# Patient Record
Sex: FEMALE | Race: WHITE | URBAN - NONMETROPOLITAN AREA
[De-identification: names, ages, dates, MRNs, and addresses within clinical notes are randomized per-mention and may not be internally consistent; named-entity substitution may affect disease eponyms.]

---

## 2017-01-01 ENCOUNTER — TELEPHONE (OUTPATIENT)
Dept: FAMILY MEDICINE | Facility: OTHER | Age: 82
End: 2017-01-01

## 2017-01-01 ENCOUNTER — NURSING HOME VISIT (OUTPATIENT)
Dept: FAMILY MEDICINE | Facility: OTHER | Age: 82
End: 2017-01-01
Payer: MEDICARE

## 2017-01-01 VITALS
RESPIRATION RATE: 15 BRPM | HEART RATE: 72 BPM | DIASTOLIC BLOOD PRESSURE: 69 MMHG | OXYGEN SATURATION: 95 % | BODY MASS INDEX: 33.12 KG/M2 | WEIGHT: 175.4 LBS | HEIGHT: 61 IN | TEMPERATURE: 97.4 F | SYSTOLIC BLOOD PRESSURE: 121 MMHG

## 2017-01-01 DIAGNOSIS — F02.80 LATE ONSET ALZHEIMER'S DISEASE WITHOUT BEHAVIORAL DISTURBANCE (H): Primary | ICD-10-CM

## 2017-01-01 DIAGNOSIS — Z78.9 NURSING HOME RESIDENT: Primary | ICD-10-CM

## 2017-01-01 DIAGNOSIS — G30.1 LATE ONSET ALZHEIMER'S DISEASE WITHOUT BEHAVIORAL DISTURBANCE (H): Primary | ICD-10-CM

## 2017-01-01 PROCEDURE — 99308 SBSQ NF CARE LOW MDM 20: CPT | Performed by: FAMILY MEDICINE

## 2017-01-01 RX ORDER — FUROSEMIDE 20 MG
20 TABLET ORAL DAILY
COMMUNITY

## 2017-01-01 RX ORDER — DIPHENHYDRAMINE HCL 25 MG
1 CAPSULE ORAL PRN
COMMUNITY

## 2017-01-01 RX ORDER — LOTEPREDNOL ETABONATE 5 MG/ML
1 SUSPENSION/ DROPS OPHTHALMIC DAILY
COMMUNITY

## 2017-01-01 RX ORDER — NYSTATIN 100000 [USP'U]/G
POWDER TOPICAL
COMMUNITY

## 2017-01-01 RX ORDER — ERYTHROMYCIN 5 MG/G
1 OINTMENT OPHTHALMIC AT BEDTIME
COMMUNITY

## 2017-01-11 PROBLEM — M40.03 POSTURAL KYPHOSIS OF CERVICOTHORACIC REGION: Status: ACTIVE | Noted: 2017-01-01

## 2017-01-11 PROBLEM — R60.9 EDEMA: Status: ACTIVE | Noted: 2017-01-01

## 2017-01-11 PROBLEM — I51.7 CARDIOMEGALY: Status: ACTIVE | Noted: 2017-01-01

## 2017-01-11 PROBLEM — R41.82 ALTERED MENTAL STATUS: Status: ACTIVE | Noted: 2017-01-01

## 2017-01-11 PROBLEM — E87.0 HYPEROSMOLALITY AND/OR HYPERNATREMIA: Status: ACTIVE | Noted: 2017-01-01

## 2017-01-11 PROBLEM — I50.20 SYSTOLIC HEART FAILURE (H): Status: ACTIVE | Noted: 2017-01-01

## 2017-01-11 PROBLEM — R09.02 HYPOXEMIA: Status: ACTIVE | Noted: 2017-01-01

## 2017-01-11 PROBLEM — N18.30 CHRONIC KIDNEY DISEASE, STAGE III (MODERATE) (H): Status: ACTIVE | Noted: 2017-01-01

## 2017-01-11 PROBLEM — R82.90 NONSPECIFIC FINDING ON EXAMINATION OF URINE: Status: ACTIVE | Noted: 2017-01-01

## 2017-01-11 PROBLEM — G30.9 ALZHEIMER'S DISEASE (H): Status: ACTIVE | Noted: 2017-01-01

## 2017-01-11 PROBLEM — E88.09 OTHER DISORDERS OF PLASMA-PROTEIN METABOLISM, NOT ELSEWHERE CLASSIFIED: Status: ACTIVE | Noted: 2017-01-01

## 2017-01-11 PROBLEM — R79.89 OTHER SPECIFIED ABNORMAL FINDINGS OF BLOOD CHEMISTRY: Status: ACTIVE | Noted: 2017-01-01

## 2017-01-11 PROBLEM — J98.4 DISEASE OF LUNG: Status: ACTIVE | Noted: 2017-01-01

## 2017-01-11 PROBLEM — F02.80 ALZHEIMER'S DISEASE (H): Status: ACTIVE | Noted: 2017-01-01

## 2017-01-11 PROBLEM — M80.00XS AGE-RELATED OSTEOPOROSIS WITH CURRENT PATHOLOGICAL FRACTURE, SEQUELA: Status: ACTIVE | Noted: 2017-01-01

## 2017-01-11 PROBLEM — M25.50 PAIN IN JOINT, MULTIPLE SITES: Status: ACTIVE | Noted: 2017-01-01

## 2017-01-11 PROBLEM — J90 PLEURAL EFFUSION: Status: ACTIVE | Noted: 2017-01-01

## 2017-01-12 PROBLEM — Z78.9 NURSING HOME RESIDENT: Status: ACTIVE | Noted: 2017-01-01

## 2017-01-12 NOTE — PROGRESS NOTES
HISTORY OF PRESENT ILLNESS:  Lucrecia is a 89 year old female (8/27/1918)  resident of [facility]  who is being seen today for a routine 30 day follow up. She was admitted to short term rehab after having exacerbation of heart failure.  Lucrecia has done well and wishes to return home.  She will be discharged.. Patient offers no other complaint.  Staff notes no other issues.    Current medications, allergies, and interdisciplinary care plan are reviewed.      Patient Active Problem List    Diagnosis Date Noted     Nursing Home Visit 01/12/2017     Priority: Medium     Altered mental status 01/11/2017     Priority: Medium     Edema 01/11/2017     Priority: Medium     Other specified abnormal findings of blood chemistry 01/11/2017     Priority: Medium     Nonspecific finding on examination of urine 01/11/2017     Priority: Medium     Hyperosmolality and/or hypernatremia 01/11/2017     Priority: Medium     Other disorders of plasma-protein metabolism, not elsewhere classified 01/11/2017     Priority: Medium     Alzheimer's disease 01/11/2017     Priority: Medium     Systolic heart failure (H) 01/11/2017     Priority: Medium     Cardiomegaly 01/11/2017     Priority: Medium     Pleural effusion 01/11/2017     Priority: Medium     Disease of lung 01/11/2017     Priority: Medium     Pain in joint, multiple sites 01/11/2017     Priority: Medium     Postural kyphosis of cervicothoracic region 01/11/2017     Priority: Medium     Age-related osteoporosis with current pathological fracture, sequela 01/11/2017     Priority: Medium     Chronic kidney disease, stage III (moderate) 01/11/2017     Priority: Medium     Hypoxemia 01/11/2017     Priority: Medium          Social History     Social History     Marital Status:      Spouse Name: N/A     Number of Children: N/A     Years of Education: N/A     Occupational History     Not on file.     Social History Main Topics     Smoking status: Not on file      "Smokeless tobacco: Not on file     Alcohol Use: Not on file     Drug Use: Not on file     Sexual Activity: Not on file     Other Topics Concern     Not on file     Social History Narrative     No narrative on file        Current Outpatient Prescriptions   Medication Sig     Acetaminophen 325 MG CAPS Take 650 mg by mouth every 6 hours as needed     hypromellose-dextran 0.3-0.1% (ARTIFICIAL TEARS) opthalmic solution Place 1 drop into both eyes as needed     erythromycin (ROMYCIN) ophthalmic ointment Place 1 Application Into the left eye At Bedtime     furosemide (LASIX) 20 MG tablet Take 20 mg by mouth daily     loteprednol (LOTEMAX) 0.5 % ophthalmic susp Place 1 drop Into the left eye daily     nystatin (MYCOSTATIN) 748740 UNIT/GM POWD Apply under breasts, abd folds groin and right side of neck TID     No current facility-administered medications for this visit.       Allergies   Allergen Reactions     Bacitracin      Clarithromycin        I have reviewed the care plan and do agree with the plan. and Patient's desire to return to the community is appropriate.      ROS:  No chest pain, shortness of breath, fever, chills, headache, nausea, vomiting, dysuria, or changes in bowel habits.  Appetite is normal.  No pain noted.          OBJECTIVE:  /69 mmHg  Pulse 72  Temp(Src) 97.4  F (36.3  C)  Resp 15  Ht 5' 1\" (1.549 m)  Wt 175 lb 6.4 oz (79.561 kg)  BMI 33.16 kg/m2  SpO2 95%    GENERAL:  Chronically ill appearing, alert, and in no acute distress  RESP:  Lungs clear.  No rales, rhonchi, or wheezing  CV:  RRR.  S1 S2 without detectable murmur. No clicks or rubs.  SKIN:  Age-related changes.  No suspicious lesions or rashes.  PSYCH:  Mentation intact, affect bright, and orientation not assessed.  EXTREM:  1+ edema.  Pulses palpable.          Lab/Diagnostic data:    None    ASSESSMENT/ORDERS:  Nursing Home Visit  Above issues stable.  No changes in current medications or care plan.      Total time spent with " patient visit was 25 min including patient visit, review of pertinent clinical information, and treatment plan.      Kuldip Dodd MD

## 2017-01-17 NOTE — TELEPHONE ENCOUNTER
They have received a referral for her and wondering if he'll follow her with this company and they also need a face to face scheduled with . Please call her back.

## 2017-01-17 NOTE — TELEPHONE ENCOUNTER
3:39 PM    Reason for Call: Phone Call    Description: Zoey (daughter) called/pt was discharged from nursing home/they need to set up OT and PT for her/wondering if she needs to be seen for this since provider just saw her last week/please call her back at [phone]    Was an appointment offered for this call? Yes, but daughter wondered if pt really needs to be seen again    Preferred method for responding to this message: Telephone Call    If we cannot reach you directly, may we leave a detailed response at the number you provided? Yes    Can this message wait until your PCP/provider returns, if available today? Not applicable    Beatrice Man

## 2017-01-18 NOTE — TELEPHONE ENCOUNTER
Patient's daughter would like to schedule an appointment with a [facility] provider for convenience. Transferred to scheduling to arrange an establish care appointment.

## 2017-01-18 NOTE — TELEPHONE ENCOUNTER
Notified that we would follow her and that we will do a face to face for her admission to home care services.

## 2017-01-18 NOTE — TELEPHONE ENCOUNTER
1:43 PM    Reason for Call: Phone Call    Description: Lucrecia's daughter called stating that [facility] called that Lucrecia needs a face to face not just a referral, but Dr. Dodd just saw her last week at [facility]. Does she need to come in or can this be done without her coming  In . Please call Zoey to advise. If she does need to come in , when could that be ? She would like her seen in [facility]    Was an appointment offered for this call?   No    Preferred method for responding to this message: [phone]  - daughter Zoey    If we cannot reach you directly, may we leave a detailed response at the number you provided?  Yes    Sarah Nunez